# Patient Record
(demographics unavailable — no encounter records)

---

## 2024-10-29 NOTE — HISTORY OF PRESENT ILLNESS
[N] : Patient reports normal menses [Y] : Positive pregnancy history [FreeTextEntry1] : PATIENT PRESENT FOR DEPO CONTRACEPTION.  NDC [id number] LOT#[id number] EX: 7/31/27 [Mammogramdate] : 0 [BreastSonogramDate] : 0 [PapSmeardate] : 0 [BoneDensityDate] : 0 [ColonoscopyDate] : 0 [LMPDate] : 10/25/24 [PGxTotal] : 1 [[facility] Center[facility]] : 1